# Patient Record
Sex: FEMALE | Race: WHITE | NOT HISPANIC OR LATINO | URBAN - METROPOLITAN AREA
[De-identification: names, ages, dates, MRNs, and addresses within clinical notes are randomized per-mention and may not be internally consistent; named-entity substitution may affect disease eponyms.]

---

## 2017-05-24 PROCEDURE — 87491 CHLMYD TRACH DNA AMP PROBE: CPT | Performed by: OBSTETRICS & GYNECOLOGY

## 2017-05-24 PROCEDURE — 87591 N.GONORRHOEAE DNA AMP PROB: CPT | Performed by: OBSTETRICS & GYNECOLOGY

## 2018-06-11 PROCEDURE — 86665 EPSTEIN-BARR CAPSID VCA: CPT | Performed by: PHYSICIAN ASSISTANT

## 2018-06-11 PROCEDURE — 86664 EPSTEIN-BARR NUCLEAR ANTIGEN: CPT | Performed by: PHYSICIAN ASSISTANT

## 2018-06-11 PROCEDURE — 87081 CULTURE SCREEN ONLY: CPT | Performed by: PHYSICIAN ASSISTANT

## 2018-06-11 PROCEDURE — 86663 EPSTEIN-BARR ANTIBODY: CPT | Performed by: PHYSICIAN ASSISTANT

## 2018-11-14 PROCEDURE — 88175 CYTOPATH C/V AUTO FLUID REDO: CPT | Performed by: OBSTETRICS & GYNECOLOGY

## 2019-11-25 ENCOUNTER — WALK IN (OUTPATIENT)
Dept: URGENT CARE | Age: 24
End: 2019-11-25

## 2019-11-25 VITALS
HEART RATE: 120 BPM | TEMPERATURE: 98.5 F | SYSTOLIC BLOOD PRESSURE: 120 MMHG | DIASTOLIC BLOOD PRESSURE: 76 MMHG | RESPIRATION RATE: 20 BRPM

## 2019-11-25 DIAGNOSIS — J20.9 ACUTE BRONCHITIS, UNSPECIFIED ORGANISM: Primary | ICD-10-CM

## 2019-11-25 PROCEDURE — 99203 OFFICE O/P NEW LOW 30 MIN: CPT | Performed by: NURSE PRACTITIONER

## 2019-11-25 RX ORDER — PREDNISONE 20 MG/1
40 TABLET ORAL DAILY
Qty: 10 TABLET | Refills: 0 | Status: SHIPPED | OUTPATIENT
Start: 2019-11-25 | End: 2019-11-30

## 2019-11-25 RX ORDER — ALBUTEROL SULFATE 90 UG/1
2 AEROSOL, METERED RESPIRATORY (INHALATION) EVERY 4 HOURS PRN
Qty: 1 INHALER | Refills: 0 | Status: SHIPPED | OUTPATIENT
Start: 2019-11-25 | End: 2019-12-05

## 2019-11-25 RX ORDER — AZITHROMYCIN 250 MG/1
TABLET, FILM COATED ORAL
Qty: 6 TABLET | Refills: 0 | Status: SHIPPED | OUTPATIENT
Start: 2019-11-25

## 2019-11-25 ASSESSMENT — ENCOUNTER SYMPTOMS
DIAPHORESIS: 0
SORE THROAT: 0
FATIGUE: 1
WHEEZING: 1
FEVER: 0
STRIDOR: 0
SHORTNESS OF BREATH: 1
CHILLS: 0
COUGH: 1
RHINORRHEA: 1

## 2020-03-16 NOTE — LETTER
Betina Jamison 182  [address] [facility] [address], [address] [facility]  Authorization for Surgical Operation and Procedure     Date:___________                                                                                                         Time:__________ Would like lab results.   [phone] and allergic reactions, hemolytic reactions, transmission of diseases such as Hepatitis, AIDS and Cytomegalovirus (CMV) and fluid overload. In the event that I wish to have an autologous transfusion of my own blood, or a directed donor transfusion.   I arnav recovery period ends for purposes of reinstating the DNAR order.   10. Patients having a sterilization procedure: I understand that if the procedure is successful the results will be permanent and it will therefore be impossible for me to inseminate, lisy additional procedures as necessary. Some examples are: Starting or using an “IV” to give me medicine, fluids or blood during my procedure, and having a breathing tube placed to help me breathe when I’m asleep (intubation).  In the event that my heart stops complications include headache, bleeding, infection, seizure, irregular heart rhythms, and nerve injury.     I can change my mind about having anesthesia services at any time before I get the medicine.    ____________________________________________________

## 2020-08-15 ENCOUNTER — WALK IN (OUTPATIENT)
Dept: URGENT CARE | Age: 25
End: 2020-08-15

## 2020-08-15 ENCOUNTER — TELEPHONE (OUTPATIENT)
Dept: SCHEDULING | Age: 25
End: 2020-08-15

## 2020-08-15 VITALS
OXYGEN SATURATION: 100 % | TEMPERATURE: 99.2 F | SYSTOLIC BLOOD PRESSURE: 118 MMHG | DIASTOLIC BLOOD PRESSURE: 70 MMHG | RESPIRATION RATE: 18 BRPM | HEART RATE: 82 BPM | WEIGHT: 140 LBS

## 2020-08-15 DIAGNOSIS — H10.9 BACTERIAL CONJUNCTIVITIS: Primary | ICD-10-CM

## 2020-08-15 PROCEDURE — 99214 OFFICE O/P EST MOD 30 MIN: CPT | Performed by: NURSE PRACTITIONER

## 2020-08-15 RX ORDER — POLYMYXIN B SULFATE AND TRIMETHOPRIM 1; 10000 MG/ML; [USP'U]/ML
1 SOLUTION OPHTHALMIC EVERY 4 HOURS
Qty: 1 BOTTLE | Refills: 0 | Status: SHIPPED | OUTPATIENT
Start: 2020-08-15 | End: 2020-08-22

## 2020-08-15 ASSESSMENT — ENCOUNTER SYMPTOMS
EYE DISCHARGE: 1
ALLERGIC/IMMUNOLOGIC NEGATIVE: 1
CONSTITUTIONAL NEGATIVE: 1
EYE PAIN: 0
PHOTOPHOBIA: 0
EYE ITCHING: 1
EYE REDNESS: 1
RESPIRATORY NEGATIVE: 1

## 2020-08-15 ASSESSMENT — VISUAL ACUITY: OU: 1

## 2021-06-09 ENCOUNTER — APPOINTMENT (OUTPATIENT)
Dept: ULTRASOUND IMAGING | Age: 26
End: 2021-06-09
Attending: NURSE PRACTITIONER
Payer: COMMERCIAL

## 2021-06-09 ENCOUNTER — HOSPITAL ENCOUNTER (EMERGENCY)
Age: 26
Discharge: HOME | End: 2021-06-09
Attending: EMERGENCY MEDICINE
Payer: COMMERCIAL

## 2021-06-09 VITALS
WEIGHT: 135 LBS | SYSTOLIC BLOOD PRESSURE: 129 MMHG | RESPIRATION RATE: 16 BRPM | OXYGEN SATURATION: 99 % | DIASTOLIC BLOOD PRESSURE: 85 MMHG | BODY MASS INDEX: 24.84 KG/M2 | HEIGHT: 62 IN | HEART RATE: 81 BPM | TEMPERATURE: 98 F

## 2021-06-09 DIAGNOSIS — K80.20 CALCULUS OF GALLBLADDER WITHOUT CHOLECYSTITIS WITHOUT OBSTRUCTION: Primary | ICD-10-CM

## 2021-06-09 PROCEDURE — 81025 URINE PREGNANCY TEST: CPT

## 2021-06-09 PROCEDURE — 80053 COMPREHEN METABOLIC PANEL: CPT | Performed by: NURSE PRACTITIONER

## 2021-06-09 PROCEDURE — 76700 US EXAM ABDOM COMPLETE: CPT | Performed by: NURSE PRACTITIONER

## 2021-06-09 PROCEDURE — 36415 COLL VENOUS BLD VENIPUNCTURE: CPT

## 2021-06-09 PROCEDURE — 85025 COMPLETE CBC W/AUTO DIFF WBC: CPT | Performed by: NURSE PRACTITIONER

## 2021-06-09 PROCEDURE — 83690 ASSAY OF LIPASE: CPT | Performed by: NURSE PRACTITIONER

## 2021-06-09 PROCEDURE — 81001 URINALYSIS AUTO W/SCOPE: CPT | Performed by: NURSE PRACTITIONER

## 2021-06-09 PROCEDURE — 87086 URINE CULTURE/COLONY COUNT: CPT | Performed by: NURSE PRACTITIONER

## 2021-06-09 PROCEDURE — 81015 MICROSCOPIC EXAM OF URINE: CPT | Performed by: NURSE PRACTITIONER

## 2021-06-09 PROCEDURE — 99283 EMERGENCY DEPT VISIT LOW MDM: CPT

## 2021-06-09 PROCEDURE — 99284 EMERGENCY DEPT VISIT MOD MDM: CPT

## 2021-06-09 RX ORDER — ACETAMINOPHEN 500 MG
1000 TABLET ORAL ONCE
Status: COMPLETED | OUTPATIENT
Start: 2021-06-09 | End: 2021-06-09

## 2021-06-09 NOTE — ED PROVIDER NOTES
Patient Seen in: THE [facility] Emergency Department In [place]      History   Patient presents with:  Back Pain    Stated Complaint: r sided mid back pain- worse with bending    HPI/Subjective:   26-year-old female presents to the emergency department for ri kg/m²         Physical Exam  Vitals and nursing note reviewed. Constitutional:       General: She is not in acute distress. Appearance: Normal appearance. She is not ill-appearing. HENT:      Head: Normocephalic.    Cardiovascular:      Rate and Rhy Please view results for these tests on the individual orders. URINE CULTURE, ROUTINE   CBC W/ DIFFERENTIAL     US ABDOMEN COMPLETE (CPT=76700)    Result Date: 6/9/2021  PROCEDURE:  US ABDOMEN COMPLETE (CPT=76700)  COMPARISON:  None.   INDICAT importance of following up with Primary care physicican. The patient verbalized understanding of the discharge instructions and plan.                        Disposition and Plan     Clinical Impression:  Calculus of gallbladder without cholecystitis withou

## 2021-06-09 NOTE — ED INITIAL ASSESSMENT (HPI)
Had r sided back pain in the night a couple of weeks ago- took T3 and pain relieved-- pain returned 2 days ago- worse at night- pain more intense since 1600 last night- worse with sleeping on side or bending

## 2021-06-11 ENCOUNTER — TELEPHONE (OUTPATIENT)
Dept: SURGERY | Facility: CLINIC | Age: 26
End: 2021-06-11

## 2021-06-11 ENCOUNTER — APPOINTMENT (OUTPATIENT)
Dept: ULTRASOUND IMAGING | Facility: HOSPITAL | Age: 26
DRG: 419 | End: 2021-06-11
Attending: EMERGENCY MEDICINE
Payer: COMMERCIAL

## 2021-06-11 ENCOUNTER — HOSPITAL ENCOUNTER (INPATIENT)
Facility: HOSPITAL | Age: 26
LOS: 3 days | Discharge: HOME | DRG: 419 | End: 2021-06-14
Attending: EMERGENCY MEDICINE | Admitting: HOSPITALIST
Payer: COMMERCIAL

## 2021-06-11 DIAGNOSIS — K81.0 ACUTE CHOLECYSTITIS: ICD-10-CM

## 2021-06-11 DIAGNOSIS — K83.1 OBSTRUCTIVE JAUNDICE: Primary | ICD-10-CM

## 2021-06-11 PROCEDURE — 99223 1ST HOSP IP/OBS HIGH 75: CPT | Performed by: HOSPITALIST

## 2021-06-11 PROCEDURE — 99223 1ST HOSP IP/OBS HIGH 75: CPT | Performed by: COLON & RECTAL SURGERY

## 2021-06-11 PROCEDURE — 76700 US EXAM ABDOM COMPLETE: CPT | Performed by: EMERGENCY MEDICINE

## 2021-06-11 RX ORDER — ONDANSETRON 2 MG/ML
4 INJECTION INTRAMUSCULAR; INTRAVENOUS EVERY 6 HOURS PRN
Status: DISCONTINUED | OUTPATIENT
Start: 2021-06-11 | End: 2021-06-14 | Stop reason: HOSPADM

## 2021-06-11 RX ORDER — PROCHLORPERAZINE EDISYLATE 5 MG/ML
5 INJECTION INTRAMUSCULAR; INTRAVENOUS EVERY 8 HOURS PRN
Status: DISCONTINUED | OUTPATIENT
Start: 2021-06-11 | End: 2021-06-14 | Stop reason: HOSPADM

## 2021-06-11 RX ORDER — ENOXAPARIN SODIUM 100 MG/ML
40 INJECTION SUBCUTANEOUS DAILY
Status: DISCONTINUED | OUTPATIENT
Start: 2021-06-12 | End: 2021-06-14 | Stop reason: HOSPADM

## 2021-06-11 RX ORDER — MORPHINE SULFATE 4 MG/ML
4 INJECTION, SOLUTION INTRAMUSCULAR; INTRAVENOUS EVERY 2 HOUR PRN
Status: DISCONTINUED | OUTPATIENT
Start: 2021-06-11 | End: 2021-06-14 | Stop reason: HOSPADM

## 2021-06-11 RX ORDER — DEXTROSE, SODIUM CHLORIDE, SODIUM LACTATE, POTASSIUM CHLORIDE, AND CALCIUM CHLORIDE 5; .6; .31; .03; .02 G/100ML; G/100ML; G/100ML; G/100ML; G/100ML
INJECTION, SOLUTION INTRAVENOUS CONTINUOUS
Status: DISCONTINUED | OUTPATIENT
Start: 2021-06-11 | End: 2021-06-14 | Stop reason: HOSPADM

## 2021-06-11 RX ORDER — LORAZEPAM 2 MG/ML
1 INJECTION INTRAMUSCULAR ONCE
Status: COMPLETED | OUTPATIENT
Start: 2021-06-11 | End: 2021-06-12

## 2021-06-11 RX ORDER — MORPHINE SULFATE 2 MG/ML
2 INJECTION, SOLUTION INTRAMUSCULAR; INTRAVENOUS EVERY 2 HOUR PRN
Status: DISCONTINUED | OUTPATIENT
Start: 2021-06-11 | End: 2021-06-14 | Stop reason: HOSPADM

## 2021-06-11 RX ORDER — ONDANSETRON 2 MG/ML
4 INJECTION INTRAMUSCULAR; INTRAVENOUS EVERY 4 HOURS PRN
Status: DISCONTINUED | OUTPATIENT
Start: 2021-06-11 | End: 2021-06-11

## 2021-06-11 RX ORDER — SODIUM CHLORIDE 9 MG/ML
INJECTION, SOLUTION INTRAVENOUS CONTINUOUS
Status: ACTIVE | OUTPATIENT
Start: 2021-06-11 | End: 2021-06-11

## 2021-06-11 RX ORDER — HYDROMORPHONE HYDROCHLORIDE 1 MG/ML
0.5 INJECTION, SOLUTION INTRAMUSCULAR; INTRAVENOUS; SUBCUTANEOUS EVERY 30 MIN PRN
Status: DISCONTINUED | OUTPATIENT
Start: 2021-06-11 | End: 2021-06-11

## 2021-06-11 RX ORDER — MORPHINE SULFATE 2 MG/ML
1 INJECTION, SOLUTION INTRAMUSCULAR; INTRAVENOUS EVERY 2 HOUR PRN
Status: DISCONTINUED | OUTPATIENT
Start: 2021-06-11 | End: 2021-06-14 | Stop reason: HOSPADM

## 2021-06-11 RX ORDER — ACETAMINOPHEN 325 MG/1
650 TABLET ORAL EVERY 6 HOURS PRN
Status: DISCONTINUED | OUTPATIENT
Start: 2021-06-11 | End: 2021-06-14 | Stop reason: HOSPADM

## 2021-06-11 RX ORDER — MELATONIN
3 NIGHTLY PRN
Status: DISCONTINUED | OUTPATIENT
Start: 2021-06-11 | End: 2021-06-14 | Stop reason: HOSPADM

## 2021-06-11 NOTE — PLAN OF CARE
NURSING ADMISSION NOTE      Pt arrived from the ED in stable condition    Patient admitted via [address] to room. Safety precautions initiated. Bed in low position. Call light in reach.

## 2021-06-11 NOTE — TELEPHONE ENCOUNTER
Patients mother called office. She is scheduled for new patient gallbladder consult with Dr. Kenya Mckeon on 6/16. Mother reports ongoing pain that hasn't stopped since she was seen in urgent care on 6/9, urine discoloration, and jaundiced eyes.  I informed her sh

## 2021-06-11 NOTE — H&P
[facility]  History and Physical     Conner Umanzor Patient Status:  Emergency    1995 MRN [id number]   Location [address] [facility] Attending Talia Webster MD   Hosp Day # 0 PCP None Pcp     Chief Complaint: jaundice Review of Systems:   A comprehensive 14 point review of systems was completed. Pertinent positives and negatives noted in the HPI.     Physical Exam:    BP (!) 130/93   Pulse 74   Temp 97.8 °F (36.6 °C) (Temporal)   Resp 16   Wt 134 lb 14.7 oz (6 replacement at this time      Quality:  · DVT Prophylaxis: lovenox  · CODE status: full  · Olivares: no    Plan of care discussed with ED physician    Jose Luis Patterson MD  6/11/2021

## 2021-06-11 NOTE — ED INITIAL ASSESSMENT (HPI)
Pt from ic, pt instructed on wedneday to get gallbladder removed, pt pain since last tues , pt jaundice , pt noticed urine turned orange yesterday, pain constant, pt family called surgery d/t pt sx, pt instructed to go to er, pt pmhx migraines, pt aox4, na

## 2021-06-11 NOTE — ED PROVIDER NOTES
Patient Seen in: [facility] Emergency Department      History   Patient presents with:  Jaundice    Stated Complaint: Abd pain    HPI/Subjective:   HPI    Patient presents with abdominal pain and jaundice.   The patient was seen by an APRN and myself (Oral)   Resp 18   Wt 61.2 kg   SpO2 99%   BMI 24.68 kg/m²         Physical Exam    General: Alert and oriented x3 in no acute distress. HEENT: Normocephalic, atraumatic, pupils equal round and reactive to light, sclera icteric. Neck: Supple.   Cardiovasc PATIENT STATED HISTORY: (As transcribed by Technologist)  Patient offered no additional history at this time. FINDINGS:  LIVER:  Normal size and echogenicity. No significant masses.  BILIARY:  There are numerous gallstones seen within the gallbladder rica significant masses. BILIARY:  Cholelithiasis. No wall thickening or pericholecystic fluid. No biliary dilatation. Common bile duct diameter is 5 mm. PANCREAS:  Normal. SPLEEN:  Normal. KIDNEYS:  Normal.  Right kidney measures 11 cm.   Left kidney measure

## 2021-06-12 ENCOUNTER — APPOINTMENT (OUTPATIENT)
Dept: MRI IMAGING | Facility: HOSPITAL | Age: 26
DRG: 419 | End: 2021-06-12
Attending: HOSPITALIST
Payer: COMMERCIAL

## 2021-06-12 ENCOUNTER — ANESTHESIA EVENT (OUTPATIENT)
Dept: ENDOSCOPY | Facility: HOSPITAL | Age: 26
DRG: 419 | End: 2021-06-12
Payer: COMMERCIAL

## 2021-06-12 PROCEDURE — 76376 3D RENDER W/INTRP POSTPROCES: CPT | Performed by: HOSPITALIST

## 2021-06-12 PROCEDURE — 99233 SBSQ HOSP IP/OBS HIGH 50: CPT | Performed by: COLON & RECTAL SURGERY

## 2021-06-12 PROCEDURE — 99232 SBSQ HOSP IP/OBS MODERATE 35: CPT | Performed by: HOSPITALIST

## 2021-06-12 PROCEDURE — 74183 MRI ABD W/O CNTR FLWD CNTR: CPT | Performed by: HOSPITALIST

## 2021-06-12 NOTE — H&P (VIEW-ONLY)
[facility]  Report of Consultation    Nessa Tinoco Patient Status:  Inpatient    1995 MRN [id number]   [facility] 3NW-A Attending Kirti Ya MD   Hosp Day # 0 PCP None Pcp     Requesting Physician:  Dr. Linden La MRI 2012     Past Surgical History:   Procedure Laterality Date   • GASTRO - DMG      endoscopy   • WISDOM TEETH REMOVED       Family History   Problem Relation Age of Onset   • Heart Disorder Maternal Grandfather         heart attack--heavy smoker   • Pro Negative for chest pain and leg swelling. Gastrointestinal: Positive for nausea and abdominal pain. Negative for vomiting, diarrhea, constipation, blood in stool, abdominal distention and anal bleeding.    Endocrine: Negative for cold intolerance and heat * 91   BUN 9 4*   CREATSERUM 0.76 0.85   GFRAA 127 111   GFRNAA 110 96   CA 9.2 9.4   ALB 3.9 3.8    137   K 4.0 4.1    107   CO2 25.0 24.0   ALKPHO 46 99*   AST 27 189*   ALT 39 295*   BILT 0.8 7.0*   TP 7.8 7.5         No results for pain. She was found to have gallstones without evidence of cholecystitis. LFTs and white blood cell count at that point were within normal limits. She was given instruction to follow-up for surgical evaluation.  However today patient returned to the emergen

## 2021-06-12 NOTE — CONSULTS
[facility]  Report of Consultation    Rishabh Edmund Patient Status:  Inpatient    1995 MRN [id number]   [facility] 3NW-A Attending Oral Traylor MD   Hosp Day # 0 PCP None Pcp     Requesting Physician:  Dr. Janett Gaxiola MRI 2012     Past Surgical History:   Procedure Laterality Date   • GASTRO - DMG      endoscopy   • WISDOM TEETH REMOVED       Family History   Problem Relation Age of Onset   • Heart Disorder Maternal Grandfather         heart attack--heavy smoker   • Pro Negative for chest pain and leg swelling. Gastrointestinal: Positive for nausea and abdominal pain. Negative for vomiting, diarrhea, constipation, blood in stool, abdominal distention and anal bleeding.    Endocrine: Negative for cold intolerance and heat * 91   BUN 9 4*   CREATSERUM 0.76 0.85   GFRAA 127 111   GFRNAA 110 96   CA 9.2 9.4   ALB 3.9 3.8    137   K 4.0 4.1    107   CO2 25.0 24.0   ALKPHO 46 99*   AST 27 189*   ALT 39 295*   BILT 0.8 7.0*   TP 7.8 7.5         No results for pain. She was found to have gallstones without evidence of cholecystitis. LFTs and white blood cell count at that point were within normal limits. She was given instruction to follow-up for surgical evaluation.  However today patient returned to the emergen

## 2021-06-12 NOTE — CONSULTS
Gastroenterology Initial Consultation    [facility] Patient Status:  Inpatient    1995 MRN [id number]   [facility] 3NW-A Attending Paz Mayberry MD   Hosp Day # 1 PCP None Pcp       Reason for Consultation/Chief Complaint: upp (ZOFRAN) injection 4 mg, 4 mg, Intravenous, Q6H PRN  •  Prochlorperazine Edisylate (COMPAZINE) injection 5 mg, 5 mg, Intravenous, Q8H PRN  •  dextrose 5 % /lactated ringers infusion, , Intravenous, Continuous  •  Enoxaparin Sodium (LOVENOX) 40 MG/0.4ML inj breath or bad taste in mouth, hearing loss. Physical Exam:    Blood pressure 130/90, pulse 84, temperature 98.5 °F (36.9 °C), temperature source Oral, resp. rate 16, weight 134 lb 14.7 oz (61.2 kg), SpO2 100 %, not currently breastfeeding.     Constitu

## 2021-06-12 NOTE — PLAN OF CARE
Patient resting in bed. VSS. Mild pain at present time. NPO. POC discussed, all questions and concerns addressed. All safety measures in place. Will continue to monitor. RN paged GI regarding consult.

## 2021-06-12 NOTE — PROGRESS NOTES
[facility]  Progress Note    [facility] Patient Status:  Inpatient    1995 MRN [id number]   [facility] 3NW-A Attending Paz Mayberry MD   Hosp Day # 1 PCP None Pcp     Subjective:  MRCP with common duct stone.   Patient with o of care. The diagnosis, prognosis, and general treatment was explained to the patient and the family.      Juanis Sharpe MD  [facility] Surgery  6/12/2021  11:29 AM

## 2021-06-12 NOTE — PLAN OF CARE
Problem: Patient/Family Goals  Goal: Patient/Family Long Term Goal  Description: Patient's Long Term Goal: discharge    Interventions:  - surgery  - See additional Care Plan goals for specific interventions  Outcome: Progressing  Goal: Patient/Family Mili integrity  - Assess and document dressing/incision, wound bed, drain sites and surrounding tissue  - Implement wound care per orders  - Initiate isolation precautions as appropriate  - Initiate Pressure Ulcer prevention bundle as indicated  Outcome: Dorian

## 2021-06-12 NOTE — PLAN OF CARE
Assumed care of pt at [address]. Pt c/o pressure like pain to right upper quadrant radiating to the back and was medicated with relief. Pt denies nausea. Remains npo. Went down for MRCP results pending. Remains on ivf. Care endorsed to oncoming RN.

## 2021-06-12 NOTE — H&P (VIEW-ONLY)
Gastroenterology Initial Consultation    Meghan Job Patient Status:  Inpatient    1995 MRN [id number]   [facility] 3NW-A Attending Judy Main MD   Hosp Day # 1 PCP None Pcp       Reason for Consultation/Chief Complaint: upp (ZOFRAN) injection 4 mg, 4 mg, Intravenous, Q6H PRN  •  Prochlorperazine Edisylate (COMPAZINE) injection 5 mg, 5 mg, Intravenous, Q8H PRN  •  dextrose 5 % /lactated ringers infusion, , Intravenous, Continuous  •  Enoxaparin Sodium (LOVENOX) 40 MG/0.4ML inj breath or bad taste in mouth, hearing loss. Physical Exam:    Blood pressure 130/90, pulse 84, temperature 98.5 °F (36.9 °C), temperature source Oral, resp. rate 16, weight 134 lb 14.7 oz (61.2 kg), SpO2 100 %, not currently breastfeeding.     Constitu

## 2021-06-13 ENCOUNTER — ANESTHESIA (OUTPATIENT)
Dept: ENDOSCOPY | Facility: HOSPITAL | Age: 26
DRG: 419 | End: 2021-06-13
Payer: COMMERCIAL

## 2021-06-13 ENCOUNTER — APPOINTMENT (OUTPATIENT)
Dept: GENERAL RADIOLOGY | Facility: HOSPITAL | Age: 26
DRG: 419 | End: 2021-06-13
Attending: HOSPITALIST
Payer: COMMERCIAL

## 2021-06-13 PROCEDURE — 99232 SBSQ HOSP IP/OBS MODERATE 35: CPT | Performed by: HOSPITALIST

## 2021-06-13 PROCEDURE — 74328 X-RAY BILE DUCT ENDOSCOPY: CPT | Performed by: HOSPITALIST

## 2021-06-13 PROCEDURE — 0FC98ZZ EXTIRPATION OF MATTER FROM COMMON BILE DUCT, VIA NATURAL OR ARTIFICIAL OPENING ENDOSCOPIC: ICD-10-PCS | Performed by: INTERNAL MEDICINE

## 2021-06-13 PROCEDURE — 99232 SBSQ HOSP IP/OBS MODERATE 35: CPT | Performed by: COLON & RECTAL SURGERY

## 2021-06-13 PROCEDURE — BF101ZZ FLUOROSCOPY OF BILE DUCTS USING LOW OSMOLAR CONTRAST: ICD-10-PCS | Performed by: INTERNAL MEDICINE

## 2021-06-13 RX ORDER — HYDROMORPHONE HYDROCHLORIDE 1 MG/ML
0.4 INJECTION, SOLUTION INTRAMUSCULAR; INTRAVENOUS; SUBCUTANEOUS EVERY 5 MIN PRN
Status: ACTIVE | OUTPATIENT
Start: 2021-06-13 | End: 2021-06-14

## 2021-06-13 RX ORDER — NALOXONE HYDROCHLORIDE 0.4 MG/ML
80 INJECTION, SOLUTION INTRAMUSCULAR; INTRAVENOUS; SUBCUTANEOUS AS NEEDED
Status: ACTIVE | OUTPATIENT
Start: 2021-06-13 | End: 2021-06-14

## 2021-06-13 RX ORDER — LIDOCAINE HYDROCHLORIDE 10 MG/ML
INJECTION, SOLUTION EPIDURAL; INFILTRATION; INTRACAUDAL; PERINEURAL AS NEEDED
Status: DISCONTINUED | OUTPATIENT
Start: 2021-06-13 | End: 2021-06-13 | Stop reason: SURG

## 2021-06-13 RX ORDER — SODIUM CHLORIDE, SODIUM LACTATE, POTASSIUM CHLORIDE, CALCIUM CHLORIDE 600; 310; 30; 20 MG/100ML; MG/100ML; MG/100ML; MG/100ML
INJECTION, SOLUTION INTRAVENOUS CONTINUOUS PRN
Status: DISCONTINUED | OUTPATIENT
Start: 2021-06-13 | End: 2021-06-13 | Stop reason: SURG

## 2021-06-13 RX ORDER — SODIUM CHLORIDE, SODIUM LACTATE, POTASSIUM CHLORIDE, CALCIUM CHLORIDE 600; 310; 30; 20 MG/100ML; MG/100ML; MG/100ML; MG/100ML
INJECTION, SOLUTION INTRAVENOUS CONTINUOUS
Status: DISCONTINUED | OUTPATIENT
Start: 2021-06-13 | End: 2021-06-14 | Stop reason: HOSPADM

## 2021-06-13 RX ADMIN — SODIUM CHLORIDE, SODIUM LACTATE, POTASSIUM CHLORIDE, CALCIUM CHLORIDE: 600; 310; 30; 20 INJECTION, SOLUTION INTRAVENOUS at 12:22:00

## 2021-06-13 RX ADMIN — SODIUM CHLORIDE, SODIUM LACTATE, POTASSIUM CHLORIDE, CALCIUM CHLORIDE: 600; 310; 30; 20 INJECTION, SOLUTION INTRAVENOUS at 12:50:00

## 2021-06-13 RX ADMIN — LIDOCAINE HYDROCHLORIDE 50 MG: 10 INJECTION, SOLUTION EPIDURAL; INFILTRATION; INTRACAUDAL; PERINEURAL at 12:22:00

## 2021-06-13 NOTE — ANESTHESIA PREPROCEDURE EVALUATION
PRE-OP EVALUATION    Patient Name: Adelia Jacobsen    Admit Diagnosis: Obstructive jaundice [K83.1]    Pre-op Diagnosis: Obstructive jaundice [K83.1]    ENDOSCOPIC RETROGRADE CHOLANGIOPANCREATOGRAPHY (ERCP)    Anesthesia Procedure: ENDOSCOPIC RETROGRADE CHOL jaundice                          (+) irritable bowel syndrome     Cardiovascular    Negative cardiovascular ROS. MET: >4                                           Endo/Other    Negative endo/other ROS.                               Pulmonary    Nega

## 2021-06-13 NOTE — PLAN OF CARE
Pt alert and oriented x 4. Up ad natasha. Voiding with no difficulties. Lung sounds clear on room air. Abdomen soft and non-distended. Bowel sounds present. Pt denies nausea, tolerating clear liquids. NPO at midnight for ERCP in the morning. POC discussed.  IVF redness and/or skin breakdown  - Initiate interventions, skin care algorithm/standards of care as needed  Outcome: Progressing  Goal: Incision(s), wounds(s) or drain site(s) healing without S/S of infection  Description: INTERVENTIONS:  - Assess and docume

## 2021-06-13 NOTE — PROGRESS NOTES
[facility]  Progress Note    Theone Phoenix Patient Status:  Inpatient    1995 MRN [id number]   Location [address] [place] [address] Attending Anastacia Aguilar MD   [facility] Day # 2 PCP None Pcp     Subjective:   The patient is sitting comfo assistant. I agree with her physical exam and the data listed in the report, and I have made any relevant changes in editing her note. I have personally examined the patient and reviewed all relevant labs and reports.  I agree with the above assessment and

## 2021-06-13 NOTE — INTERVAL H&P NOTE
Pre-op Diagnosis: Obstructive jaundice [K83.1]    The above referenced H&P was reviewed by Manda Hoff DO on 6/13/2021, the patient was examined and no significant changes have occurred in the patient's condition since the H&P was performed.   I discus

## 2021-06-13 NOTE — OPERATIVE REPORT
Salma Lopez Patient Status:  Inpatient    1995 MRN [id number]   Location [address] [place] [address] Attending Yuri Perez MD   [facility] Day # 2 PCP None Pcp     PREOPERATIVE DIAGNOSIS/INDICATION: Elevated liver chemistries, iliana advanced to the intrahepatics. Contrast was injected to confirm CBD cannulation. A stone was visualized in the distal CBD. The CBD was non-dilated measuring 5 mm. Biliary sphincterotomy was performed using ERBE endocut electrocautery.  There was no post sp

## 2021-06-13 NOTE — PROGRESS NOTES
[facility]  Progress Note     Adrienne Araiza Patient Status:  Inpatient    1995 MRN [id number]   [facility] 3NW-A Attending Kimberly Arias MD   Hosp Day # 2 PCP None Pcp     Chief Complaint: abd pain    S: Patient is doing well 168 hours. Imaging: Imaging data reviewed in Epic. Medications:   • indomethacin       • enoxaparin  40 mg Subcutaneous Daily       ASSESSMENT / PLAN:     1. Biliary colic with choledocholelithasis  1. MRCP noted  2. ERCP today  3. Cont.  Pain control

## 2021-06-13 NOTE — PROGRESS NOTES
[facility]  Progress Note     Mehrdadalex Carson Patient Status:  Inpatient    1995 MRN [id number]   [facility] 3NW-A Attending Shane Bailey MD   Hosp Day # 1 PCP None Pcp     Chief Complaint: abd pain    S: Patient feels a littl DDIMER in the last 168 hours. Imaging: Imaging data reviewed in Epic. Medications:   • enoxaparin  40 mg Subcutaneous Daily       ASSESSMENT / PLAN:     1. Biliary colic  1. MRCP noted  2. ERCP tomorrow  3. Cont. Pain control  4. Cont. IVF  2.  IBS  3

## 2021-06-13 NOTE — ANESTHESIA POSTPROCEDURE EVALUATION
[facility] Patient Status:  Inpatient   Age/Gender 26year old female MRN [id number]   Location [id number] [place] [address] Attending Fatimah Lin MD   [address] Day # 2 PCP None Pcp       Anesthesia Post-op Note    ENDOSCOPIC

## 2021-06-13 NOTE — PROGRESS NOTES
Patient's HTP during endo recovery. The patient denies any pain or dizziness. Floor RN given report via phone.

## 2021-06-13 NOTE — PROGRESS NOTES
Per patient and pt's father she has been hypertensive with diastolic above 96. She denies any pain or dizziness. She states she's on BP med. Dr. Madera Ohm aware and was notified.

## 2021-06-13 NOTE — PLAN OF CARE
Patient back from ENDO. VSS. Comfortable at present time. Will advance diet. POC discussed, all questions and concerns addressed. Will continue to monitor.

## 2021-06-14 ENCOUNTER — ANESTHESIA EVENT (OUTPATIENT)
Dept: SURGERY | Facility: HOSPITAL | Age: 26
DRG: 419 | End: 2021-06-14
Payer: COMMERCIAL

## 2021-06-14 ENCOUNTER — APPOINTMENT (OUTPATIENT)
Dept: GENERAL RADIOLOGY | Facility: HOSPITAL | Age: 26
DRG: 419 | End: 2021-06-14
Attending: COLON & RECTAL SURGERY
Payer: COMMERCIAL

## 2021-06-14 ENCOUNTER — ANESTHESIA (OUTPATIENT)
Dept: SURGERY | Facility: HOSPITAL | Age: 26
DRG: 419 | End: 2021-06-14
Payer: COMMERCIAL

## 2021-06-14 VITALS
OXYGEN SATURATION: 100 % | DIASTOLIC BLOOD PRESSURE: 87 MMHG | RESPIRATION RATE: 20 BRPM | TEMPERATURE: 98 F | BODY MASS INDEX: 25 KG/M2 | WEIGHT: 134.94 LBS | HEART RATE: 87 BPM | SYSTOLIC BLOOD PRESSURE: 131 MMHG

## 2021-06-14 PROCEDURE — BF502Z0 OTHER IMAGING OF BILE DUCTS USING FLUORESCING AGENT, INTRAOPERATIVE: ICD-10-PCS | Performed by: COLON & RECTAL SURGERY

## 2021-06-14 PROCEDURE — 0FT44ZZ RESECTION OF GALLBLADDER, PERCUTANEOUS ENDOSCOPIC APPROACH: ICD-10-PCS | Performed by: COLON & RECTAL SURGERY

## 2021-06-14 PROCEDURE — 47563 LAPARO CHOLECYSTECTOMY/GRAPH: CPT | Performed by: COLON & RECTAL SURGERY

## 2021-06-14 PROCEDURE — 99239 HOSP IP/OBS DSCHRG MGMT >30: CPT | Performed by: HOSPITALIST

## 2021-06-14 PROCEDURE — 74300 X-RAY BILE DUCTS/PANCREAS: CPT | Performed by: COLON & RECTAL SURGERY

## 2021-06-14 PROCEDURE — 47563 LAPARO CHOLECYSTECTOMY/GRAPH: CPT | Performed by: STUDENT IN AN ORGANIZED HEALTH CARE EDUCATION/TRAINING PROGRAM

## 2021-06-14 RX ORDER — POLYETHYLENE GLYCOL 3350 17 G/17G
17 POWDER, FOR SOLUTION ORAL DAILY PRN
Status: DISCONTINUED | OUTPATIENT
Start: 2021-06-14 | End: 2021-06-14

## 2021-06-14 RX ORDER — HYDROMORPHONE HYDROCHLORIDE 1 MG/ML
0.4 INJECTION, SOLUTION INTRAMUSCULAR; INTRAVENOUS; SUBCUTANEOUS EVERY 5 MIN PRN
Status: DISCONTINUED | OUTPATIENT
Start: 2021-06-14 | End: 2021-06-14 | Stop reason: HOSPADM

## 2021-06-14 RX ORDER — GLYCOPYRROLATE 0.2 MG/ML
INJECTION, SOLUTION INTRAMUSCULAR; INTRAVENOUS AS NEEDED
Status: DISCONTINUED | OUTPATIENT
Start: 2021-06-14 | End: 2021-06-14 | Stop reason: SURG

## 2021-06-14 RX ORDER — METOCLOPRAMIDE HYDROCHLORIDE 5 MG/ML
INJECTION INTRAMUSCULAR; INTRAVENOUS
Status: COMPLETED
Start: 2021-06-14 | End: 2021-06-14

## 2021-06-14 RX ORDER — SODIUM CHLORIDE 9 MG/ML
INJECTION, SOLUTION INTRAVENOUS CONTINUOUS
Status: DISCONTINUED | OUTPATIENT
Start: 2021-06-14 | End: 2021-06-14

## 2021-06-14 RX ORDER — MEPERIDINE HYDROCHLORIDE 25 MG/ML
12.5 INJECTION INTRAMUSCULAR; INTRAVENOUS; SUBCUTANEOUS AS NEEDED
Status: COMPLETED | OUTPATIENT
Start: 2021-06-14 | End: 2021-06-14

## 2021-06-14 RX ORDER — KETOROLAC TROMETHAMINE 30 MG/ML
INJECTION, SOLUTION INTRAMUSCULAR; INTRAVENOUS AS NEEDED
Status: DISCONTINUED | OUTPATIENT
Start: 2021-06-14 | End: 2021-06-14 | Stop reason: SURG

## 2021-06-14 RX ORDER — MEPERIDINE HYDROCHLORIDE 25 MG/ML
INJECTION INTRAMUSCULAR; INTRAVENOUS; SUBCUTANEOUS
Status: COMPLETED
Start: 2021-06-14 | End: 2021-06-14

## 2021-06-14 RX ORDER — ONDANSETRON 2 MG/ML
4 INJECTION INTRAMUSCULAR; INTRAVENOUS AS NEEDED
Status: DISCONTINUED | OUTPATIENT
Start: 2021-06-14 | End: 2021-06-14 | Stop reason: HOSPADM

## 2021-06-14 RX ORDER — DEXAMETHASONE SODIUM PHOSPHATE 4 MG/ML
VIAL (ML) INJECTION AS NEEDED
Status: DISCONTINUED | OUTPATIENT
Start: 2021-06-14 | End: 2021-06-14 | Stop reason: SURG

## 2021-06-14 RX ORDER — CEFOXITIN 1 G/1
INJECTION, POWDER, FOR SOLUTION INTRAVENOUS AS NEEDED
Status: DISCONTINUED | OUTPATIENT
Start: 2021-06-14 | End: 2021-06-14 | Stop reason: SURG

## 2021-06-14 RX ORDER — ONDANSETRON 2 MG/ML
INJECTION INTRAMUSCULAR; INTRAVENOUS AS NEEDED
Status: DISCONTINUED | OUTPATIENT
Start: 2021-06-14 | End: 2021-06-14 | Stop reason: SURG

## 2021-06-14 RX ORDER — OXYCODONE HYDROCHLORIDE 10 MG/1
10 TABLET ORAL EVERY 4 HOURS PRN
Status: DISCONTINUED | OUTPATIENT
Start: 2021-06-14 | End: 2021-06-14

## 2021-06-14 RX ORDER — SODIUM CHLORIDE, SODIUM LACTATE, POTASSIUM CHLORIDE, CALCIUM CHLORIDE 600; 310; 30; 20 MG/100ML; MG/100ML; MG/100ML; MG/100ML
INJECTION, SOLUTION INTRAVENOUS CONTINUOUS PRN
Status: DISCONTINUED | OUTPATIENT
Start: 2021-06-14 | End: 2021-06-14 | Stop reason: SURG

## 2021-06-14 RX ORDER — BUPIVACAINE HYDROCHLORIDE AND EPINEPHRINE 2.5; 5 MG/ML; UG/ML
INJECTION, SOLUTION EPIDURAL; INFILTRATION; INTRACAUDAL; PERINEURAL AS NEEDED
Status: DISCONTINUED | OUTPATIENT
Start: 2021-06-14 | End: 2021-06-14 | Stop reason: HOSPADM

## 2021-06-14 RX ORDER — HYDROMORPHONE HYDROCHLORIDE 1 MG/ML
0.4 INJECTION, SOLUTION INTRAMUSCULAR; INTRAVENOUS; SUBCUTANEOUS EVERY 2 HOUR PRN
Status: DISCONTINUED | OUTPATIENT
Start: 2021-06-14 | End: 2021-06-14

## 2021-06-14 RX ORDER — ROCURONIUM BROMIDE 10 MG/ML
INJECTION, SOLUTION INTRAVENOUS AS NEEDED
Status: DISCONTINUED | OUTPATIENT
Start: 2021-06-14 | End: 2021-06-14 | Stop reason: SURG

## 2021-06-14 RX ORDER — SODIUM PHOSPHATE, DIBASIC AND SODIUM PHOSPHATE, MONOBASIC 7; 19 G/133ML; G/133ML
1 ENEMA RECTAL ONCE AS NEEDED
Status: DISCONTINUED | OUTPATIENT
Start: 2021-06-14 | End: 2021-06-14

## 2021-06-14 RX ORDER — OXYCODONE HYDROCHLORIDE 5 MG/1
5 TABLET ORAL EVERY 6 HOURS PRN
Qty: 10 TABLET | Refills: 0 | Status: SHIPPED | OUTPATIENT
Start: 2021-06-14

## 2021-06-14 RX ORDER — OXYCODONE HYDROCHLORIDE 5 MG/1
5 TABLET ORAL EVERY 4 HOURS PRN
Status: DISCONTINUED | OUTPATIENT
Start: 2021-06-14 | End: 2021-06-14

## 2021-06-14 RX ORDER — METOCLOPRAMIDE HYDROCHLORIDE 5 MG/ML
10 INJECTION INTRAMUSCULAR; INTRAVENOUS AS NEEDED
Status: DISCONTINUED | OUTPATIENT
Start: 2021-06-14 | End: 2021-06-14 | Stop reason: HOSPADM

## 2021-06-14 RX ORDER — HYDROMORPHONE HYDROCHLORIDE 1 MG/ML
0.8 INJECTION, SOLUTION INTRAMUSCULAR; INTRAVENOUS; SUBCUTANEOUS EVERY 2 HOUR PRN
Status: DISCONTINUED | OUTPATIENT
Start: 2021-06-14 | End: 2021-06-14

## 2021-06-14 RX ORDER — ONDANSETRON 2 MG/ML
4 INJECTION INTRAMUSCULAR; INTRAVENOUS EVERY 6 HOURS PRN
Status: DISCONTINUED | OUTPATIENT
Start: 2021-06-14 | End: 2021-06-14

## 2021-06-14 RX ORDER — ENOXAPARIN SODIUM 100 MG/ML
40 INJECTION SUBCUTANEOUS DAILY
Status: DISCONTINUED | OUTPATIENT
Start: 2021-06-14 | End: 2021-06-14

## 2021-06-14 RX ORDER — LIDOCAINE HYDROCHLORIDE 10 MG/ML
INJECTION, SOLUTION EPIDURAL; INFILTRATION; INTRACAUDAL; PERINEURAL AS NEEDED
Status: DISCONTINUED | OUTPATIENT
Start: 2021-06-14 | End: 2021-06-14 | Stop reason: SURG

## 2021-06-14 RX ORDER — BISACODYL 10 MG
10 SUPPOSITORY, RECTAL RECTAL
Status: DISCONTINUED | OUTPATIENT
Start: 2021-06-14 | End: 2021-06-14

## 2021-06-14 RX ORDER — MIDAZOLAM HYDROCHLORIDE 1 MG/ML
1 INJECTION INTRAMUSCULAR; INTRAVENOUS EVERY 5 MIN PRN
Status: DISCONTINUED | OUTPATIENT
Start: 2021-06-14 | End: 2021-06-14 | Stop reason: HOSPADM

## 2021-06-14 RX ORDER — SODIUM CHLORIDE, SODIUM LACTATE, POTASSIUM CHLORIDE, CALCIUM CHLORIDE 600; 310; 30; 20 MG/100ML; MG/100ML; MG/100ML; MG/100ML
INJECTION, SOLUTION INTRAVENOUS CONTINUOUS
Status: DISCONTINUED | OUTPATIENT
Start: 2021-06-14 | End: 2021-06-14 | Stop reason: HOSPADM

## 2021-06-14 RX ORDER — NALOXONE HYDROCHLORIDE 0.4 MG/ML
80 INJECTION, SOLUTION INTRAMUSCULAR; INTRAVENOUS; SUBCUTANEOUS AS NEEDED
Status: DISCONTINUED | OUTPATIENT
Start: 2021-06-14 | End: 2021-06-14 | Stop reason: HOSPADM

## 2021-06-14 RX ORDER — DIPHENHYDRAMINE HYDROCHLORIDE 50 MG/ML
12.5 INJECTION INTRAMUSCULAR; INTRAVENOUS AS NEEDED
Status: DISCONTINUED | OUTPATIENT
Start: 2021-06-14 | End: 2021-06-14 | Stop reason: HOSPADM

## 2021-06-14 RX ORDER — DOCUSATE SODIUM 100 MG/1
100 CAPSULE, LIQUID FILLED ORAL 2 TIMES DAILY
Status: DISCONTINUED | OUTPATIENT
Start: 2021-06-14 | End: 2021-06-14

## 2021-06-14 RX ORDER — NEOSTIGMINE METHYLSULFATE 1 MG/ML
INJECTION INTRAVENOUS AS NEEDED
Status: DISCONTINUED | OUTPATIENT
Start: 2021-06-14 | End: 2021-06-14 | Stop reason: SURG

## 2021-06-14 RX ADMIN — ROCURONIUM BROMIDE 30 MG: 10 INJECTION, SOLUTION INTRAVENOUS at 09:53:00

## 2021-06-14 RX ADMIN — LIDOCAINE HYDROCHLORIDE 50 MG: 10 INJECTION, SOLUTION EPIDURAL; INFILTRATION; INTRACAUDAL; PERINEURAL at 09:52:00

## 2021-06-14 RX ADMIN — DEXAMETHASONE SODIUM PHOSPHATE 8 MG: 4 MG/ML VIAL (ML) INJECTION at 10:02:00

## 2021-06-14 RX ADMIN — ONDANSETRON 4 MG: 2 INJECTION INTRAMUSCULAR; INTRAVENOUS at 10:02:00

## 2021-06-14 RX ADMIN — CEFOXITIN 2 G: 1 INJECTION, POWDER, FOR SOLUTION INTRAVENOUS at 10:00:00

## 2021-06-14 RX ADMIN — GLYCOPYRROLATE 0.4 MG: 0.2 INJECTION, SOLUTION INTRAMUSCULAR; INTRAVENOUS at 11:11:00

## 2021-06-14 RX ADMIN — SODIUM CHLORIDE, SODIUM LACTATE, POTASSIUM CHLORIDE, CALCIUM CHLORIDE: 600; 310; 30; 20 INJECTION, SOLUTION INTRAVENOUS at 09:48:00

## 2021-06-14 RX ADMIN — NEOSTIGMINE METHYLSULFATE 3 MG: 1 INJECTION INTRAVENOUS at 11:11:00

## 2021-06-14 RX ADMIN — KETOROLAC TROMETHAMINE 30 MG: 30 INJECTION, SOLUTION INTRAMUSCULAR; INTRAVENOUS at 11:11:00

## 2021-06-14 NOTE — PROGRESS NOTES
Pt back from PACU in stable condition, VSS and afebrile. Lap sites x4 c/d/i with gauze and tegaderm. Pt denies any nausea at this time. IVF infusing. Pt d/t void. Pt reports minimal pain, ice pack given. POC discussed and pt verbalizes understanding.

## 2021-06-14 NOTE — INTERVAL H&P NOTE
Pre-op Diagnosis: Acute cholecystitis [K81.0]    The above referenced H&P was reviewed by Val Flower MD on 6/14/2021, the patient was examined and no significant changes have occurred in the patient's condition since the H&P was performed.   I di

## 2021-06-14 NOTE — PAYOR COMM NOTE
--------------  ADMISSION REVIEW     Payor: [address] PPO  Subscriber #:  [id number]  Authorization Number: [id number]    Admit date: 6/11/21  Admit time:  5:48 PM       Admitting Physician: Deb Alonso MD  Attending Physician:  Jesi Arroyo and reactive to light, sclera icteric. Neck: Supple. Cardiovascular: Regular rate and rhythm. Respiratory: Lungs clear to auscultation.   Abdomen: Soft, very tender to palpation in the right upper quadrant, no rebound or guarding, normal active bowel ewelina There are numerous gallstones seen within the gallbladder lumen. The gallbladder wall is mildly thickened measuring 3 mm in diameter. There is no pericholecystic fluid identified. The technologist did not elicit a positive sonographic Gauthier's sign.   C Normal.  Right kidney measures 11 cm. Left kidney measures 9.1 cm. AORTA/IVC:  Patent. CONCLUSION:  1. Cholelithiasis. No biliary dilatation. Sonographer states negative sonographic Gauthier sign.     Dictated by (CST): Nickie Montes MD on 6/09/20 medical history of IBS and migraines. She has had several weeks of RUQ abdominal pain. She was in the ED recently and had an [address] which showed cholelithiasis. She was sent home with plans to f/u with gen surgery.   She now comes back to the ED with persist 3.  HEENT: Normocephalic atraumatic. Moist mucous membranes. EOM-I. PERRLA. Anicteric. Neck: No lymphadenopathy. No JVD. No carotid bruits. Respiratory: Clear to auscultation bilaterally. No wheezes. No rhonchi.   Cardiovascular: S1, S2. Regular rate and female being seen in consultation for right upper quadrant pain and elevated liver chemistries. Patient with on and off pain for 2 weeks which was now worsening over last 2 days becoming constant. No fevers or chills.  Seen in ED where patient had elevated mg, 1 mg, Intravenous, Q2H PRN **OR** morphINE sulfate (PF) 2 MG/ML injection 2 mg, 2 mg, Intravenous, Q2H PRN **OR** morphINE sulfate (PF) 4 MG/ML injection 4 mg, 4 mg, Intravenous, Q2H PRN     Review of Systems:     Except for what is noted on the HPI th appearance of nose and ears.  Normal appearance of lips, teeth, gums and oral mucosa  Neck: Normal neck inspection, normal thyroid palpation  Respiratory: Normal respiratory effort, normal breath sounds on bilateral auscultation  Cardiovascular: Normal jacob including but not limited to the risks of bleeding, cholangitis, cholecystitis, pancreatitis, pain, as well as the risks of anesthesia and perforation all possibly leading to prolonged hospitalization and surgical intervention.  All questions were answered cholangiogram revealed no further filling defects. The pancreatic duct was not cannulated. The duodenoscope was withdrawn to the stomach and all contents were suctioned.  The duodenoscope was withdrawn from the patient and the procedure completed.      IMPR

## 2021-06-14 NOTE — ANESTHESIA PREPROCEDURE EVALUATION
PRE-OP EVALUATION    Patient Name: Kristopher Ordaz    Admit Diagnosis: Obstructive jaundice [K83.1]    Pre-op Diagnosis: Acute cholecystitis [K81.0]    LAPAROSCOPIC CHOLECYSTECTOMY WITH CHOLNAGIOGRAM POSS.  OPEN    Anesthesia Procedure: LAPAROSCOPIC CHOLECYST 1 mg, 1 mg, Intravenous, Once        Outpatient Medications:   Levonorgest-Eth Estrad 91-Day (AMETHIA) 0.15-0.03 &0.01 MG Oral Tab, Take 1 tablet by mouth daily. , Disp: 91 tablet, Rfl: 3, 6/10/2021 at 2100  ibuprofen (MOTRIN) 400 MG Oral Tab, Take 800 mg b normal.  Rhythm: regular  Rate: normal  (-) murmur   Dental    No notable dental history. Pulmonary    Pulmonary exam normal.  Breath sounds clear to auscultation bilaterally.                Other findings            ASA: 2   Plan: general  NPO stat

## 2021-06-14 NOTE — PROGRESS NOTES
Patient seen and examined. Medically clear to discharge today. Had lap iliana.   Doing well post op    Pato Ramos MD

## 2021-06-14 NOTE — PROGRESS NOTES
[facility] Gastroenterology     Raine Marbella Patient Status:  Inpatient    1995 MRN [id number]   [facility] 3NW-A Attending Jordin Patel MD   [facility] Day # 3 PCP None Pcp       Reason fo 40 mg Subcutaneous Daily       Allergies:    Pcn [Penicillins]           Imaging:  I have personally reviewed all pertinent available imaging.        ASSESSMENT / PLAN:     Kristopher Ordaz is a 26year old female with GI consult for elevated liver enzymes, ch

## 2021-06-14 NOTE — PAYOR COMM NOTE
--------------  CONTINUED STAY REVIEW    Payor: [employer]  Subscriber #:  [id number]  Authorization Number: [id number]    Admit date: 6/11/21  Admit time:  5:48 PM    Admitting Physician: Mary Mederos MD  Attending Physician:  Chanel Reynaga recorded     @[facility](        Imaging: Imaging data reviewed in Epic.     Medications:   • enoxaparin  40 mg Subcutaneous Daily         Allergies:     Pcn [Penicillins]            Imaging:  I have personally reviewed all pertinent available imaging. [I.V.:500]  Out: 400 [Urine:400]     Assessment  Patient Active Problem List:     IBS (irritable bowel syndrome)     Obstructive jaundice     Biliary colic     Jaundice     Choledocholithiasis     Plan:  1.  The patient underwent ERCP earlier this morning hepatobiliary surgeon, postoperative bile leak,  retained common bile duct stones, the need for post-procedure ERCP as well as the need for further therapeutic diagnostic or surgical intervention.  The possibility of conversion to open procedure was also ex Date Action Dose Route User    6/14/2021 1111 Given 30 mg Intravenous Rachael Meza MD      lactated ringers infusion     Date Action Dose Route User    6/13/2021 1630 New Bag (none) Intravenous Jaya Rojas RN      lactated ringers infusion

## 2021-06-14 NOTE — OPERATIVE REPORT
[facility]  Operative Note    Krunal Bermudez Location: OR   CSN [id number] MRN [id number]    1995 Age 26year old   Admission Date 2021 Operation Date 2021   Attending Physician Morteza Chavez MD Operating Physician Frederick Oconnor was no unexpected abnormality of the visible abdominal organs. Attention was turned to the right upper quadrant. The patient was positioned with head up, right side up.  Three 5-mm incisions were made in the upper abdomen - one in the subxiphoid position a cystic artery were visualized and inspected; they were well approximated, well situated, and there was no evidence of active bleeding or bile leak. The specimen was then extracted from the umbilical trocar site.   Pneumoperitoneum was released and trocars r

## 2021-06-14 NOTE — ANESTHESIA PROCEDURE NOTES
Airway  Date/Time: 6/14/2021 9:54 AM  Urgency: elective      General Information and Staff    Patient location during procedure: OR  Anesthesiologist: Ute Christie MD  Performed: anesthesiologist     Indications and Patient Condition  Indications for

## 2021-06-14 NOTE — PLAN OF CARE
A&O x4. Denies any CP, ALBERT, or calf pain at present. Lungs clear bilaterally. Abdomen soft, nontender, nondistended. Bowel sounds present. NPO. Voiding without difficulty. IVF infusing.  Pt reports very minimal pain at this time, declines any need for pain factors for pressure ulcer development  - Assess and document skin integrity  - Monitor for areas of redness and/or skin breakdown  - Initiate interventions, skin care algorithm/standards of care as needed  Outcome: Progressing  Goal: Incision(s), wounds(s

## 2021-06-14 NOTE — PLAN OF CARE
NURSING DISCHARGE NOTE    Discharged Home via Wheelchair. Accompanied by Family member and Support staff  Belongings Taken by patient/family. Pt in stable condition and reports feeling ready to go home.  Discharge instructions given, pt verbalizes unde

## 2021-06-14 NOTE — PLAN OF CARE
Pt alert and oriented x 4. Up ad natasha. Voiding with no difficulties. Lung sounds clear on room air. Abdomen soft and non-distended. Bowel sounds present. Pt denies nausea, tolerating soft diet. NPO at midnight for lap/iliana in the morning.  Pt reporting mild development  - Assess and document skin integrity  - Monitor for areas of redness and/or skin breakdown  - Initiate interventions, skin care algorithm/standards of care as needed  Outcome: Progressing  Goal: Incision(s), wounds(s) or drain site(s) healing

## 2021-06-15 NOTE — PAYOR COMM NOTE
--------------  DISCHARGE REVIEW    Payor: [facility]  Subscriber #:  [id number]  Authorization Number: [id number]    Admit date: 6/11/21  Admit time:   5:48 PM  Discharge Date: 6/14/2021  4:18 PM     Admitting Physician: Norman Sharif MD

## 2021-06-16 NOTE — PAYOR COMM NOTE
--------------  CONTINUED STAY REVIEW    [facility]  Subscriber #:  [id number]  Authorization Number: [id number]    Admit date: 6/11/21  Admit time:  5:48 PM  DC 6/14      Admitting Physician: Oral Traylor MD  Attending Physician:

## 2021-06-23 NOTE — DISCHARGE SUMMARY
[facility]ITAL[facility]  DISCHARGE SUMMARY     Jose Sands Patient Status:  Inpatient    1995 MRN [id number]   [facility] 3NW-A Attending No att. providers found   Hosp Day # 3 PCP None Pcp     Date of Admission: 2021  Date of [address] Prescription details   ibuprofen 400 MG Tabs  Commonly known as: MOTRIN  Notes to patient: You may alternate oxycodone and ibuprofen for pain      Take 800 mg by mouth every 6 (six) hours as needed for Pain or Fever.    Refills: 0     Levonorgest-Eth Ziggy Dhillon sounds.     -----------------------------------------------------------------------------------------------  PATIENT DISCHARGE INSTRUCTIONS: See electronic chart    Oswaldo Mojica MD    Time spent:  > 30 minutes

## 2021-06-29 ENCOUNTER — OFFICE VISIT (OUTPATIENT)
Dept: SURGERY | Facility: CLINIC | Age: 26
End: 2021-06-29

## 2021-06-29 VITALS
BODY MASS INDEX: 23.92 KG/M2 | SYSTOLIC BLOOD PRESSURE: 137 MMHG | HEART RATE: 89 BPM | DIASTOLIC BLOOD PRESSURE: 98 MMHG | WEIGHT: 135 LBS | HEIGHT: 63 IN | TEMPERATURE: 98 F

## 2021-06-29 DIAGNOSIS — Z90.49 S/P LAPAROSCOPIC CHOLECYSTECTOMY: Primary | ICD-10-CM

## 2021-06-29 PROCEDURE — 3075F SYST BP GE 130 - 139MM HG: CPT | Performed by: STUDENT IN AN ORGANIZED HEALTH CARE EDUCATION/TRAINING PROGRAM

## 2021-06-29 PROCEDURE — 3080F DIAST BP >= 90 MM HG: CPT | Performed by: STUDENT IN AN ORGANIZED HEALTH CARE EDUCATION/TRAINING PROGRAM

## 2021-06-29 PROCEDURE — 99024 POSTOP FOLLOW-UP VISIT: CPT | Performed by: STUDENT IN AN ORGANIZED HEALTH CARE EDUCATION/TRAINING PROGRAM

## 2021-06-29 PROCEDURE — 3008F BODY MASS INDEX DOCD: CPT | Performed by: STUDENT IN AN ORGANIZED HEALTH CARE EDUCATION/TRAINING PROGRAM

## 2021-06-29 NOTE — PROGRESS NOTES
Post Operative Visit Note       Active Problems  1.  S/P laparoscopic cholecystectomy         Chief Complaint   Patient presents with:  Gallbladder: PO - 6/14 gallbladder removal ([facility]) --  Pt states she is feeling well, denies any pain to GI issues at this Cigarettes      Smokeless tobacco: Never Used    Vaping Use      Vaping Use: Never used    Substance and Sexual Activity      Alcohol use:  Yes        Alcohol/week: 3.0 standard drinks        Types: 3 Cans of beer per week      Drug use: No      Sexual acti sleep disturbance. Physical Findings   BP (!) 137/98   Pulse 89   Temp 97.5 °F (36.4 °C)   Ht 63\"   Wt 135 lb (61.2 kg)   LMP  (LMP Unknown)   BMI 23.91 kg/m²   Physical Exam  Vitals and nursing note reviewed.    Constitutional:       Appearance: Nor make an appointment as needed. • The patient understands and agrees to the current plan. All questions and concerns were addressed during today's encounter. No orders of the defined types were placed in this encounter.       Imaging & Referrals

## 2021-10-21 ENCOUNTER — OFFICE VISIT (OUTPATIENT)
Dept: SURGERY | Facility: CLINIC | Age: 26
End: 2021-10-21
Payer: COMMERCIAL

## 2021-10-21 ENCOUNTER — LABORATORY ENCOUNTER (OUTPATIENT)
Dept: LAB | Age: 26
End: 2021-10-21
Attending: COLON & RECTAL SURGERY
Payer: COMMERCIAL

## 2021-10-21 VITALS
TEMPERATURE: 97 F | BODY MASS INDEX: 23.92 KG/M2 | HEART RATE: 86 BPM | DIASTOLIC BLOOD PRESSURE: 86 MMHG | SYSTOLIC BLOOD PRESSURE: 141 MMHG | WEIGHT: 135 LBS | HEIGHT: 63 IN

## 2021-10-21 DIAGNOSIS — R10.13 EPIGASTRIC PAIN: ICD-10-CM

## 2021-10-21 DIAGNOSIS — Z90.49 S/P LAPAROSCOPIC CHOLECYSTECTOMY: ICD-10-CM

## 2021-10-21 DIAGNOSIS — Z90.49 S/P LAPAROSCOPIC CHOLECYSTECTOMY: Primary | ICD-10-CM

## 2021-10-21 PROCEDURE — 3079F DIAST BP 80-89 MM HG: CPT | Performed by: COLON & RECTAL SURGERY

## 2021-10-21 PROCEDURE — 80053 COMPREHEN METABOLIC PANEL: CPT

## 2021-10-21 PROCEDURE — 83690 ASSAY OF LIPASE: CPT

## 2021-10-21 PROCEDURE — 3077F SYST BP >= 140 MM HG: CPT | Performed by: COLON & RECTAL SURGERY

## 2021-10-21 PROCEDURE — 3008F BODY MASS INDEX DOCD: CPT | Performed by: COLON & RECTAL SURGERY

## 2021-10-21 PROCEDURE — 36415 COLL VENOUS BLD VENIPUNCTURE: CPT

## 2021-10-21 PROCEDURE — 99213 OFFICE O/P EST LOW 20 MIN: CPT | Performed by: COLON & RECTAL SURGERY

## 2021-10-21 NOTE — PROGRESS NOTES
Office Visit Note       Active Problems      1. S/P laparoscopic cholecystectomy    2.  Epigastric pain          Chief Complaint   Patient presents with:  Gallbladder        History of Present Illness  Rishabh Shaffer is a 23year old female who presents toda cancer   • Hypertension Paternal Grandmother    • Hypertension Paternal Grandfather      Social History    Tobacco Use      Smoking status: Current Every Day Smoker        Packs/day: 0.25        Types: Cigarettes      Smokeless tobacco: Never Used    Vapin

## 2022-02-18 NOTE — ANESTHESIA POSTPROCEDURE EVALUATION
[facility] Patient Status:  Inpatient   Age/Gender 26year old female MRN [id number]   Location [facility] Attending Kirti Ya MD   [facility] Day # 3 PCP None Pcp       Anesthesia Post-op Note    Gladies Yulia Yes

## (undated) DEVICE — 1200CC GUARDIAN II: Brand: GUARDIAN

## (undated) DEVICE — LIGHT HANDLE

## (undated) DEVICE — TROCAR: Brand: KII SHIELDED BLADED ACCESS SYSTEM

## (undated) DEVICE — SOL  .9 1000ML BTL

## (undated) DEVICE — 3M™ RED DOT™ MONITORING ELECTRODE WITH FOAM TAPE AND STICKY GEL, 50/BAG, 20/CASE, 72/PLT 2570: Brand: RED DOT™

## (undated) DEVICE — LAP CHOLE/APPY CDS-LF: Brand: MEDLINE INDUSTRIES, INC.

## (undated) DEVICE — ENDOSCOPY PACK UPPER: Brand: MEDLINE INDUSTRIES, INC.

## (undated) DEVICE — DISPOSABLE LAPAROSCOPIC CLIP APPLIER WITH 20 CLIPS.: Brand: EPIX® UNIVERSAL CLIP APPLIER

## (undated) DEVICE — C-ARM: Brand: UNBRANDED

## (undated) DEVICE — TIGERTAIL 5F FLXTIP 70CM

## (undated) DEVICE — OMNIPAQUE 300 POLYB 50ML

## (undated) DEVICE — GAUZE SPONGES,12 PLY: Brand: CURITY

## (undated) DEVICE — RETRIEVAL BALLOON CATHETER: Brand: EXTRACTOR™ PRO RX

## (undated) DEVICE — TROCAR: Brand: KII® SLEEVE

## (undated) DEVICE — SOL  .9 1000ML BAG

## (undated) DEVICE — ENDOPATH ULTRA VERESS INSUFFLATION NEEDLES WITH LUER LOCK CONNECTORS: Brand: ENDOPATH

## (undated) DEVICE — HYDRA JAGWIRE

## (undated) DEVICE — LOCKING DEVICE RX & BIOPSY CAP

## (undated) DEVICE — ZIPWIRE GUIDEWIRE .035X150 STR

## (undated) DEVICE — ENDOPATH 5MM CURVED SCISSORS WITH MONOPOLAR CAUTERY: Brand: ENDOPATH

## (undated) DEVICE — REM POLYHESIVE ADULT PATIENT RETURN ELECTRODE: Brand: VALLEYLAB

## (undated) DEVICE — TISSUE RETRIEVAL SYSTEM: Brand: INZII RETRIEVAL SYSTEM

## (undated) DEVICE — SPHINCTEROTOME: Brand: HYDRATOME RX 44

## (undated) DEVICE — 40580 - THE PINK PAD - ADVANCED TRENDELENBURG POSITIONING KIT: Brand: 40580 - THE PINK PAD - ADVANCED TRENDELENBURG POSITIONING KIT

## (undated) DEVICE — SUTURE MONOCRYL 4-0 PS-2

## (undated) DEVICE — CHLORAPREP 26ML APPLICATOR

## (undated) DEVICE — PDS II VLT 0 107CM AG ST3: Brand: ENDOLOOP

## (undated) DEVICE — MONOFILAMENT ABSORBABLE SUTURE: Brand: MAXON

## (undated) DEVICE — SCD SLEEVE KNEE HI BLEND

## (undated) DEVICE — 3M(TM) TEGADERM(TM) TRANSPARENT FILM DRESSING FRAME STYLE 9505W: Brand: 3M™ TEGADERM™

## (undated) DEVICE — STERILE POLYISOPRENE POWDER-FREE SURGICAL GLOVES: Brand: PROTEXIS

## (undated) DEVICE — MEDI-VAC SUCTION HANDLE REGULAR CAPACITY: Brand: CARDINAL HEALTH

## (undated) DEVICE — FILTERLINE NASAL ADULT O2/CO2

## (undated) DEVICE — VISUALIZATION SYSTEM: Brand: CLEARIFY

## (undated) DEVICE — Device

## (undated) DEVICE — MEDI-VAC NON-CONDUCTIVE SUCTION TUBING: Brand: CARDINAL HEALTH

## (undated) NOTE — LETTER
Betina Jamison 89  [address] [facility] [address], [address] [facility]  Authorization for Surgical Operation and Procedure     Date:___________                                                                                                         Time:__________ risks that can occur: fever and allergic reactions, hemolytic reactions, transmission of diseases such as Hepatitis, AIDS and Cytomegalovirus (CMV) and fluid overload.   In the event that I wish to have an autologous transfusion of my own blood, or a direct determine when the applicable recovery period ends for purposes of reinstating the DNAR order.   10. Patients having a sterilization procedure: I understand that if the procedure is successful the results will be permanent and it will therefore be impossibl doctor) to give me medicine and do additional procedures as necessary.  Some examples are: Starting or using an “IV” to give me medicine, fluids or blood during my procedure, and having a breathing tube placed to help me breathe when I’m asleep (intubation) understand that rare but potential complications include headache, bleeding, infection, seizure, irregular heart rhythms, and nerve injury.     I can change my mind about having anesthesia services at any time before I get the medicine.    _________________